# Patient Record
Sex: MALE | Race: WHITE
[De-identification: names, ages, dates, MRNs, and addresses within clinical notes are randomized per-mention and may not be internally consistent; named-entity substitution may affect disease eponyms.]

---

## 2017-05-18 ENCOUNTER — HOSPITAL ENCOUNTER (EMERGENCY)
Dept: HOSPITAL 8 - ED | Age: 32
Discharge: HOME | End: 2017-05-18
Payer: MEDICAID

## 2017-05-18 VITALS — HEIGHT: 70 IN | BODY MASS INDEX: 25.25 KG/M2 | WEIGHT: 176.37 LBS

## 2017-05-18 VITALS — SYSTOLIC BLOOD PRESSURE: 126 MMHG | DIASTOLIC BLOOD PRESSURE: 78 MMHG

## 2017-05-18 DIAGNOSIS — L89.92: Primary | ICD-10-CM

## 2017-05-18 DIAGNOSIS — F15.10: ICD-10-CM

## 2017-05-18 DIAGNOSIS — F17.210: ICD-10-CM

## 2017-05-18 PROCEDURE — 99284 EMERGENCY DEPT VISIT MOD MDM: CPT
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
## 2018-11-24 PROBLEM — S01.81XA: Status: ACTIVE | Noted: 2018-11-24

## 2018-11-24 PROBLEM — F15.929 METHAMPHETAMINE INTOXICATION (HCC): Status: ACTIVE | Noted: 2018-11-24

## 2018-11-24 PROBLEM — F10.929 ACUTE ALCOHOL INTOXICATION (HCC): Status: ACTIVE | Noted: 2018-11-24

## 2019-03-10 ENCOUNTER — APPOINTMENT (OUTPATIENT)
Dept: RADIOLOGY | Facility: MEDICAL CENTER | Age: 34
End: 2019-03-10
Attending: EMERGENCY MEDICINE

## 2019-03-10 ENCOUNTER — HOSPITAL ENCOUNTER (EMERGENCY)
Facility: MEDICAL CENTER | Age: 34
End: 2019-03-10
Attending: EMERGENCY MEDICINE

## 2019-03-10 VITALS
RESPIRATION RATE: 20 BRPM | HEART RATE: 97 BPM | SYSTOLIC BLOOD PRESSURE: 116 MMHG | WEIGHT: 172.4 LBS | BODY MASS INDEX: 22.85 KG/M2 | DIASTOLIC BLOOD PRESSURE: 77 MMHG | OXYGEN SATURATION: 97 % | HEIGHT: 73 IN | TEMPERATURE: 98.1 F

## 2019-03-10 DIAGNOSIS — M25.572 ACUTE LEFT ANKLE PAIN: ICD-10-CM

## 2019-03-10 DIAGNOSIS — S90.02XA CONTUSION OF LEFT ANKLE, INITIAL ENCOUNTER: ICD-10-CM

## 2019-03-10 DIAGNOSIS — L03.116 CELLULITIS OF LEFT LOWER EXTREMITY: ICD-10-CM

## 2019-03-10 PROCEDURE — A9270 NON-COVERED ITEM OR SERVICE: HCPCS | Performed by: EMERGENCY MEDICINE

## 2019-03-10 PROCEDURE — A9270 NON-COVERED ITEM OR SERVICE: HCPCS

## 2019-03-10 PROCEDURE — 700102 HCHG RX REV CODE 250 W/ 637 OVERRIDE(OP): Performed by: EMERGENCY MEDICINE

## 2019-03-10 PROCEDURE — 73610 X-RAY EXAM OF ANKLE: CPT | Mod: LT

## 2019-03-10 PROCEDURE — 99284 EMERGENCY DEPT VISIT MOD MDM: CPT

## 2019-03-10 PROCEDURE — 700102 HCHG RX REV CODE 250 W/ 637 OVERRIDE(OP)

## 2019-03-10 RX ORDER — IBUPROFEN 600 MG/1
600 TABLET ORAL ONCE
Status: COMPLETED | OUTPATIENT
Start: 2019-03-10 | End: 2019-03-10

## 2019-03-10 RX ORDER — SULFAMETHOXAZOLE AND TRIMETHOPRIM 800; 160 MG/1; MG/1
TABLET ORAL
Status: COMPLETED
Start: 2019-03-10 | End: 2019-03-10

## 2019-03-10 RX ORDER — SULFAMETHOXAZOLE AND TRIMETHOPRIM 800; 160 MG/1; MG/1
1 TABLET ORAL 2 TIMES DAILY
Qty: 20 TAB | Refills: 0 | Status: SHIPPED | OUTPATIENT
Start: 2019-03-10 | End: 2019-03-20

## 2019-03-10 RX ORDER — SULFAMETHOXAZOLE AND TRIMETHOPRIM 800; 160 MG/1; MG/1
1 TABLET ORAL ONCE
Status: COMPLETED | OUTPATIENT
Start: 2019-03-10 | End: 2019-03-10

## 2019-03-10 RX ADMIN — IBUPROFEN 600 MG: 600 TABLET ORAL at 05:15

## 2019-03-10 RX ADMIN — SULFAMETHOXAZOLE AND TRIMETHOPRIM 1 TABLET: 800; 160 TABLET ORAL at 05:06

## 2019-03-10 ASSESSMENT — ENCOUNTER SYMPTOMS
BRUISES/BLEEDS EASILY: 0
CHILLS: 0
SHORTNESS OF BREATH: 0
FEVER: 0
BACK PAIN: 0

## 2019-03-10 NOTE — ED NOTES
D/c inst reviewed w/ the pt to include f/u in 2 days, return if no improvement w/ the atb ( the new rx ), and pain management w/ ice and otc medications. The L ankle was ace wrapped for support. The pt verb understanding and denies questions.

## 2019-03-10 NOTE — ED NOTES
Left foot, shin, ankle pain.  Kicked table approx 5 days ago, attempted to catch his fall and kicked table a couple more times.     Pain was starting to subside, but during afternoon hours of 3/09/19 pain became worse.      taking 600 mg motrin     Smoke: marijuana daily 1-5 times daily  Alcohol: every other day

## 2019-03-10 NOTE — ED TRIAGE NOTES
"Chief Complaint   Patient presents with   • Ankle Pain     left ankle pain and swelling after running into a coffee table, injury happened 5 days ago.      /82   Pulse (!) 103   Temp 36.4 °C (97.6 °F) (Temporal)   Resp 18   Ht 1.854 m (6' 1\")   Wt 78.2 kg (172 lb 6.4 oz)   SpO2 99%   BMI 22.75 kg/m²       "

## 2019-03-10 NOTE — ED PROVIDER NOTES
ED Provider Note    CHIEF COMPLAINT  Chief Complaint   Patient presents with   • Ankle Pain     left ankle pain and swelling after running into a coffee table, injury happened 5 days ago.        VANNESSA Brandon is a 33 y.o. male with a history of drug use presents the emergency department for evaluation of left ankle pain.  Patient reports that approximately 5 days ago he tripped over a coffee table and accidentally hit his ankle multiple times on the same coffee table while trying to catch himself.  He had pain and swelling of the ankle which seemed to be improving over the last several days and then seemed to have worsened today.  He noticed swelling of the ankle and bruising, but now has redness on the medial surface.  He denies any fever, chills, streaking erythema, or known history of MRSA infection.  He has previously had skin infections, but he is unsure if it was MRSA.  Patient has been taking ibuprofen with some relief at home.    Patient admits to drinking alcohol tonight, smoking methamphetamine, and smoking marijuana.    REVIEW OF SYSTEMS  Review of Systems   Constitutional: Negative for chills and fever.   Respiratory: Negative for shortness of breath.    Cardiovascular: Negative for chest pain.   Musculoskeletal: Negative for back pain and joint pain.        +left ankle pain and swelling   Skin: Negative for rash.   Endo/Heme/Allergies: Negative for environmental allergies. Does not bruise/bleed easily.       PAST MEDICAL HISTORY       SOCIAL HISTORY  Social History     Social History Main Topics   • Smoking status: Current Every Day Smoker   • Smokeless tobacco: Never Used   • Alcohol use Yes   • Drug use: Yes      Comment: occasional cocaine use   • Sexual activity: Not on file       SURGICAL HISTORY  patient denies any surgical history    CURRENT MEDICATIONS  Home Medications    **Home medications have not yet been reviewed for this encounter**         ALLERGIES  No Known Allergies    PHYSICAL  "EXAM  VITAL SIGNS: /82   Pulse (!) 103   Temp 36.4 °C (97.6 °F) (Temporal)   Resp 18   Ht 1.854 m (6' 1\")   Wt 78.2 kg (172 lb 6.4 oz)   SpO2 99%   BMI 22.75 kg/m²    Pulse ox interpretation: I interpret this pulse ox as normal.    Physical Exam   Constitutional: He is oriented to person, place, and time and well-developed, well-nourished, and in no distress. No distress.   HENT:   Head: Normocephalic and atraumatic.   Mouth/Throat: Oropharynx is clear and moist.   Eyes: Pupils are equal, round, and reactive to light. Conjunctivae are normal.   Neck: Normal range of motion. Neck supple.   Cardiovascular: Normal rate, regular rhythm and intact distal pulses.    Pulmonary/Chest: Effort normal and breath sounds normal. No respiratory distress. He has no rales.   Abdominal: Soft. He exhibits no distension. There is no tenderness.   Musculoskeletal: He exhibits edema and tenderness. He exhibits no deformity.   Left medial ankle with erythema and edema. Ecchymosis present. Abrasion without discharge in area of concern on medial left ankle. Surrounding erythema is warm and tender. Neurovascularly intact.   Neurological: He is alert and oriented to person, place, and time. GCS score is 15.   Skin: Skin is warm and dry. He is not diaphoretic.   Psychiatric: Affect and judgment normal.   Nursing note and vitals reviewed.        DIAGNOSTIC STUDIES    DX-ANKLE 3+ VIEWS LEFT   Final Result      Ankle swelling. No acute osseous abnormality.          COURSE & MEDICAL DECISION MAKING  Pertinent Labs & Imaging studies reviewed. (See chart for details)  33-year-old male with a history of prior skin infections presents the emergency department with right ankle pain and swelling.  He noted an injury that occurred approximately 5 days ago, but felt that the pain was improving after that time.  He subsequently had worsening of his pain tonight and on examination had erythema and the area concerning for cellulitis.  Patient " "did have an abrasion to the area, and suspects that he developed cellulitis secondary to the open skin.  Patient otherwise appeared nontoxic.  He was tachycardic in triage, but this resolved at the time of my evaluation.  Patient was afebrile and had no history of fevers prior to presenting.    X-ray was obtained showing no evidence of acute fracture or dislocation.  Patient will be treated with Bactrim for suspected cellulitis.  He was instructed to return or go to his primary care doctor in 24-48 hours for a recheck to ensure that the cellulitis is not spreading.  Otherwise the patient should return for any new or worsening symptoms such as fever, chills, drainage or erythematous streaking from the wound.    Patient received ibuprofen for pain, and his first dose of bactrim in the emergency department.  Repeat vitals prior to discharge were as follows: /77   Pulse 97   Temp 36.7 °C (98.1 °F) (Temporal)   Resp 20   Ht 1.854 m (6' 1\")   Wt 78.2 kg (172 lb 6.4 oz)   SpO2 97%   BMI 22.75 kg/m²      The patient will return for new or worsening symptoms and is stable at the time of discharge.    The patient is referred to a primary physician for blood pressure management, diabetic screening, and for all other preventative health concerns.      DISPOSITION:  Patient will be discharged home in stable condition.    FOLLOW UP:  [facility], Emergency Dept  [address] [facility] [address]  [phone]    For wound re-check in 24-48 hours      OUTPATIENT MEDICATIONS:  New Prescriptions    SULFAMETHOXAZOLE-TRIMETHOPRIM (BACTRIM DS) 800-160 MG TABLET    Take 1 Tab by mouth 2 times a day for 10 days.         FINAL IMPRESSION  Visit Diagnoses     ICD-10-CM   1. Cellulitis of left lower extremity L03.116   2. Acute left ankle pain M25.572   3. Contusion of left ankle, initial encounter S90.02XA              Electronically signed by: Carolyn Contreras, 3/10/2019 4:45 AM        "

## 2020-06-25 ENCOUNTER — HOSPITAL ENCOUNTER (EMERGENCY)
Dept: HOSPITAL 8 - ED | Age: 35
Discharge: LEFT BEFORE DISCHARGE | End: 2020-06-25
Payer: SELF-PAY

## 2020-06-25 VITALS — WEIGHT: 170.2 LBS | BODY MASS INDEX: 24.37 KG/M2 | HEIGHT: 70 IN

## 2020-06-25 VITALS — DIASTOLIC BLOOD PRESSURE: 83 MMHG | SYSTOLIC BLOOD PRESSURE: 122 MMHG

## 2020-06-25 DIAGNOSIS — M54.2: Primary | ICD-10-CM

## 2020-06-25 DIAGNOSIS — Z53.21: ICD-10-CM

## 2020-06-26 ENCOUNTER — HOSPITAL ENCOUNTER (EMERGENCY)
Facility: MEDICAL CENTER | Age: 35
End: 2020-06-26
Attending: EMERGENCY MEDICINE | Admitting: EMERGENCY MEDICINE

## 2020-06-26 VITALS
RESPIRATION RATE: 16 BRPM | TEMPERATURE: 96.8 F | WEIGHT: 174.6 LBS | DIASTOLIC BLOOD PRESSURE: 64 MMHG | BODY MASS INDEX: 25 KG/M2 | HEIGHT: 70 IN | HEART RATE: 93 BPM | OXYGEN SATURATION: 97 % | SYSTOLIC BLOOD PRESSURE: 117 MMHG

## 2020-06-26 DIAGNOSIS — L02.91 ABSCESS: ICD-10-CM

## 2020-06-26 PROCEDURE — 303977 HCHG I & D

## 2020-06-26 PROCEDURE — 99282 EMERGENCY DEPT VISIT SF MDM: CPT

## 2020-06-26 RX ORDER — SULFAMETHOXAZOLE AND TRIMETHOPRIM 800; 160 MG/1; MG/1
1 TABLET ORAL 2 TIMES DAILY
Qty: 14 TAB | Refills: 0 | Status: SHIPPED | OUTPATIENT
Start: 2020-06-26 | End: 2020-07-03

## 2020-06-26 RX ORDER — AMOXICILLIN AND CLAVULANATE POTASSIUM 875; 125 MG/1; MG/1
1 TABLET, FILM COATED ORAL 2 TIMES DAILY
Qty: 14 TAB | Refills: 0 | Status: SHIPPED | OUTPATIENT
Start: 2020-06-26 | End: 2020-07-03

## 2020-06-26 ASSESSMENT — FIBROSIS 4 INDEX: FIB4 SCORE: 1.34

## 2020-06-27 NOTE — ED NOTES
Pt ambulatory  Vital signs stable  Pt handed d/c paperwork with understanding stated  Pt states will follow up with ed for repacking of wound  Pt handed prescriptions and states safe way home
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
